# Patient Record
Sex: FEMALE | Race: WHITE
[De-identification: names, ages, dates, MRNs, and addresses within clinical notes are randomized per-mention and may not be internally consistent; named-entity substitution may affect disease eponyms.]

---

## 2017-06-29 ENCOUNTER — HOSPITAL (OUTPATIENT)
Dept: OTHER | Age: 27
End: 2017-06-29
Attending: OBSTETRICS & GYNECOLOGY

## 2017-06-29 LAB
HEMOLYSIS 4+: NEGATIVE
HIV 1 & 2 AB SERPL IA: NONREACTIVE
HIV 1,2 COMMENT: NORMAL
SYPHILIS INSTR: 0
SYPHILIS: NON REACTIVE

## 2018-07-26 PROBLEM — R87.810 ASCUS WITH POSITIVE HIGH RISK HPV CERVICAL: Status: ACTIVE | Noted: 2018-07-26

## 2018-07-26 PROBLEM — R87.610 ASCUS WITH POSITIVE HIGH RISK HPV CERVICAL: Status: ACTIVE | Noted: 2018-07-26

## 2018-07-26 PROCEDURE — 88305 TISSUE EXAM BY PATHOLOGIST: CPT | Performed by: NURSE PRACTITIONER

## 2018-08-17 PROBLEM — R87.619 ABNORMAL PAP SMEAR OF CERVIX: Status: ACTIVE | Noted: 2018-06-01

## 2019-05-08 PROBLEM — N94.6 DYSMENORRHEA: Status: ACTIVE | Noted: 2019-05-08

## 2023-09-22 ENCOUNTER — HOSPITAL ENCOUNTER (OUTPATIENT)
Age: 33
Discharge: HOME | End: 2023-09-22
Payer: COMMERCIAL

## 2023-09-22 VITALS
DIASTOLIC BLOOD PRESSURE: 71 MMHG | RESPIRATION RATE: 16 BRPM | TEMPERATURE: 97 F | OXYGEN SATURATION: 97 % | SYSTOLIC BLOOD PRESSURE: 120 MMHG | HEART RATE: 105 BPM

## 2023-09-22 DIAGNOSIS — Z20.822 LAB TEST NEGATIVE FOR COVID-19 VIRUS: ICD-10-CM

## 2023-09-22 DIAGNOSIS — B34.9 VIRAL ILLNESS: Primary | ICD-10-CM

## 2023-09-22 LAB
S PYO AG THROAT QL: NEGATIVE
SARS-COV-2 RNA RESP QL NAA+PROBE: NOT DETECTED

## 2023-09-22 PROCEDURE — U0002 COVID-19 LAB TEST NON-CDC: HCPCS | Performed by: NURSE PRACTITIONER

## 2023-09-22 PROCEDURE — 87880 STREP A ASSAY W/OPTIC: CPT | Performed by: NURSE PRACTITIONER

## 2023-09-22 PROCEDURE — 99203 OFFICE O/P NEW LOW 30 MIN: CPT | Performed by: NURSE PRACTITIONER

## 2023-09-22 NOTE — DISCHARGE INSTRUCTIONS
Follow a brat diet bananas rice applesauce toast crackers and advance as tolerated take Tylenol as needed for any pain or discomfort drink plenty of fluids warm tea with honey. Gargle with salt water 2-3 times per day and spit out you may try throat lozenges. Follow-up with your primary care provider in 2 to 3 days. If you develop any new or worsening symptoms go to the nearest emergency department.

## (undated) NOTE — LETTER
Date & Time: 9/22/2023, 1:04 PM  Patient: Yamil Mosley  Encounter Provider(s):    JACQUES Lainez       To Whom It May Concern:    Francisco Manuel was seen and treated in our department on 9/22/2023. She should not return to work until 09/25/2023 . If you have any questions or concerns, please do not hesitate to call.     ELIANA Clark    _____________________________  FTTHIXDGS/SWX Signature